# Patient Record
Sex: FEMALE | Race: BLACK OR AFRICAN AMERICAN | NOT HISPANIC OR LATINO | URBAN - METROPOLITAN AREA
[De-identification: names, ages, dates, MRNs, and addresses within clinical notes are randomized per-mention and may not be internally consistent; named-entity substitution may affect disease eponyms.]

---

## 2018-01-12 ENCOUNTER — EMERGENCY (EMERGENCY)
Facility: HOSPITAL | Age: 48
LOS: 1 days | Discharge: HOME | End: 2018-01-12
Attending: EMERGENCY MEDICINE
Payer: COMMERCIAL

## 2018-01-12 VITALS
WEIGHT: 190.04 LBS | TEMPERATURE: 100 F | DIASTOLIC BLOOD PRESSURE: 73 MMHG | RESPIRATION RATE: 18 BRPM | SYSTOLIC BLOOD PRESSURE: 150 MMHG | HEIGHT: 65 IN | OXYGEN SATURATION: 100 % | HEART RATE: 98 BPM

## 2018-01-12 PROCEDURE — 99282 EMERGENCY DEPT VISIT SF MDM: CPT

## 2018-01-12 PROCEDURE — 99284 EMERGENCY DEPT VISIT MOD MDM: CPT

## 2018-01-12 NOTE — ED ADULT NURSE NOTE - OBJECTIVE STATEMENT
c/o colds , stuffy, runny  nose , left earache, slight headache since Monday. Seen and examined by Austin CANNON.

## 2018-01-12 NOTE — ED PROVIDER NOTE - ATTENDING CONTRIBUTION TO CARE
agree with above. feels better. likely viral illness. Discussed anticipatory guidance and return precautions.   Dc with outpt follow up.

## 2018-01-12 NOTE — ED PROVIDER NOTE - OBJECTIVE STATEMENT
48 y/o female, pmhx heart murmur c/o nasal congestion/runny nose and intermittent gradual onset mild frontal headache x5 days. Taking Tylenol Day (includes antihistamine) with relief. Denies fever/chills, thunder clap, ear pain, throat pain or any other concerns.

## 2018-01-12 NOTE — ED PROVIDER NOTE - MEDICAL DECISION MAKING DETAILS
pt well appearing, vss afebrile, c/o nasal congestion/runny nose, HEENT unremarkable, no front/max sinus tenderness, sx suggestive viral etiology, instructed to stop Tylenol Day as pt c/o palpitations when taking, pt has h/o "heart murmur", instructed to take Motrin and saline nasal flush.
